# Patient Record
Sex: MALE | Employment: UNEMPLOYED | URBAN - METROPOLITAN AREA
[De-identification: names, ages, dates, MRNs, and addresses within clinical notes are randomized per-mention and may not be internally consistent; named-entity substitution may affect disease eponyms.]

---

## 2017-02-24 ENCOUNTER — HOSPITAL ENCOUNTER (EMERGENCY)
Age: 22
Discharge: HOME | End: 2017-02-24
Attending: EMERGENCY MEDICINE
Payer: SELF-PAY

## 2017-02-24 VITALS
TEMPERATURE: 97.7 F | HEIGHT: 69 IN | SYSTOLIC BLOOD PRESSURE: 117 MMHG | WEIGHT: 155 LBS | OXYGEN SATURATION: 100 % | BODY MASS INDEX: 22.96 KG/M2 | DIASTOLIC BLOOD PRESSURE: 75 MMHG | HEART RATE: 60 BPM | RESPIRATION RATE: 20 BRPM

## 2017-02-24 DIAGNOSIS — R55 SYNCOPE AND COLLAPSE: Primary | ICD-10-CM

## 2017-02-24 LAB
ANION GAP BLD CALC-SCNC: 6 MMOL/L (ref 3–18)
ATRIAL RATE: 60 BPM
BASOPHILS # BLD AUTO: 0 K/UL (ref 0–0.06)
BASOPHILS # BLD: 0 % (ref 0–2)
BUN SERPL-MCNC: 15 MG/DL (ref 7–18)
BUN/CREAT SERPL: 15 (ref 12–20)
CALCIUM SERPL-MCNC: 9.2 MG/DL (ref 8.5–10.1)
CALCULATED P AXIS, ECG09: 61 DEGREES
CALCULATED R AXIS, ECG10: 83 DEGREES
CALCULATED T AXIS, ECG11: 64 DEGREES
CHLORIDE SERPL-SCNC: 105 MMOL/L (ref 100–108)
CO2 SERPL-SCNC: 29 MMOL/L (ref 21–32)
CREAT SERPL-MCNC: 1.03 MG/DL (ref 0.6–1.3)
DIAGNOSIS, 93000: NORMAL
DIFFERENTIAL METHOD BLD: ABNORMAL
EOSINOPHIL # BLD: 0 K/UL (ref 0–0.4)
EOSINOPHIL NFR BLD: 0 % (ref 0–5)
ERYTHROCYTE [DISTWIDTH] IN BLOOD BY AUTOMATED COUNT: 12.5 % (ref 11.6–14.5)
GLUCOSE BLD STRIP.AUTO-MCNC: 91 MG/DL (ref 70–110)
GLUCOSE SERPL-MCNC: 105 MG/DL (ref 74–99)
HCT VFR BLD AUTO: 46.9 % (ref 36–48)
HGB BLD-MCNC: 16.1 G/DL (ref 13–16)
LYMPHOCYTES # BLD AUTO: 34 % (ref 21–52)
LYMPHOCYTES # BLD: 2.4 K/UL (ref 0.9–3.6)
MCH RBC QN AUTO: 30 PG (ref 24–34)
MCHC RBC AUTO-ENTMCNC: 34.3 G/DL (ref 31–37)
MCV RBC AUTO: 87.3 FL (ref 74–97)
MONOCYTES # BLD: 0.6 K/UL (ref 0.05–1.2)
MONOCYTES NFR BLD AUTO: 9 % (ref 3–10)
NEUTS SEG # BLD: 4 K/UL (ref 1.8–8)
NEUTS SEG NFR BLD AUTO: 57 % (ref 40–73)
P-R INTERVAL, ECG05: 120 MS
PLATELET # BLD AUTO: 212 K/UL (ref 135–420)
PMV BLD AUTO: 10.2 FL (ref 9.2–11.8)
POTASSIUM SERPL-SCNC: 3.9 MMOL/L (ref 3.5–5.5)
Q-T INTERVAL, ECG07: 416 MS
QRS DURATION, ECG06: 94 MS
QTC CALCULATION (BEZET), ECG08: 416 MS
RBC # BLD AUTO: 5.37 M/UL (ref 4.7–5.5)
SODIUM SERPL-SCNC: 140 MMOL/L (ref 136–145)
VENTRICULAR RATE, ECG03: 60 BPM
WBC # BLD AUTO: 7 K/UL (ref 4.6–13.2)

## 2017-02-24 PROCEDURE — 85025 COMPLETE CBC W/AUTO DIFF WBC: CPT | Performed by: PHYSICIAN ASSISTANT

## 2017-02-24 PROCEDURE — 93005 ELECTROCARDIOGRAM TRACING: CPT

## 2017-02-24 PROCEDURE — 80048 BASIC METABOLIC PNL TOTAL CA: CPT | Performed by: PHYSICIAN ASSISTANT

## 2017-02-24 PROCEDURE — 82962 GLUCOSE BLOOD TEST: CPT

## 2017-02-24 PROCEDURE — 99283 EMERGENCY DEPT VISIT LOW MDM: CPT
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
## 2017-02-25 NOTE — DISCHARGE INSTRUCTIONS
Return to ED if you develop any new or worsening symptoms such as severe or worsening headaches; somnolence or confusion; restlessness, unsteadiness, or seizures; difficulties with vision; vomiting, fever, or stiff neck; urinary or bowel incontinence; weakness or numbness involving any part of the body. Fainting: Care Instructions  Your Care Instructions    When you faint, or pass out, you lose consciousness for a short time. A brief drop in blood flow to the brain often causes it. When you fall or lie down, more blood flows to your brain and you regain consciousness. Emotional stress, pain, or overheating--especially if you have been standing--can make you faint. In these cases, fainting is usually not serious. But fainting can be a sign of a more serious problem. Your doctor may want you to have more tests to rule out other causes. The treatment you need depends on the reason why you fainted. The doctor has checked you carefully, but problems can develop later. If you notice any problems or new symptoms, get medical treatment right away. Follow-up care is a key part of your treatment and safety. Be sure to make and go to all appointments, and call your doctor if you are having problems. It's also a good idea to know your test results and keep a list of the medicines you take. How can you care for yourself at home? · Drink plenty of fluids to prevent dehydration. If you have kidney, heart, or liver disease and have to limit fluids, talk with your doctor before you increase your fluid intake. When should you call for help? Call 911 anytime you think you may need emergency care. For example, call if:  · You have symptoms of a heart problem. These may include:  ¨ Chest pain or pressure. ¨ Severe trouble breathing. ¨ A fast or irregular heartbeat. ¨ Lightheadedness or sudden weakness. ¨ Coughing up pink, foamy mucus. ¨ Passing out.   After you call 911, the  may tell you to chew 1 adult-strength or 2 to 4 low-dose aspirin. Wait for an ambulance. Do not try to drive yourself. · You have symptoms of a stroke. These may include:  ¨ Sudden numbness, tingling, weakness, or loss of movement in your face, arm, or leg, especially on only one side of your body. ¨ Sudden vision changes. ¨ Sudden trouble speaking. ¨ Sudden confusion or trouble understanding simple statements. ¨ Sudden problems with walking or balance. ¨ A sudden, severe headache that is different from past headaches. · You passed out (lost consciousness) again. Watch closely for changes in your health, and be sure to contact your doctor if:  · You do not get better as expected. Where can you learn more? Go to http://ajit-felton.info/. Enter C893 in the search box to learn more about \"Fainting: Care Instructions. \"  Current as of: May 27, 2016  Content Version: 11.1  © 7778-9454 [employer]. Care instructions adapted under license by [employer] (which disclaims liability or warranty for this information). If you have questions about a medical condition or this instruction, always ask your healthcare professional. [place] [address] any warranty or liability for your use of this information.
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
## 2017-02-25 NOTE — ED NOTES
patient requesting to go home. Patient reports that he \"feels better\". Physician informed. Will continue to monitor.

## 2019-01-10 ENCOUNTER — APPOINTMENT (OUTPATIENT)
Dept: GENERAL RADIOLOGY | Age: 24
End: 2019-01-10
Attending: EMERGENCY MEDICINE
Payer: COMMERCIAL

## 2019-01-10 ENCOUNTER — HOSPITAL ENCOUNTER (EMERGENCY)
Age: 24
Discharge: HOME | End: 2019-01-10
Attending: EMERGENCY MEDICINE
Payer: COMMERCIAL

## 2019-01-10 VITALS
RESPIRATION RATE: 14 BRPM | HEART RATE: 67 BPM | WEIGHT: 160 LBS | HEIGHT: 69 IN | BODY MASS INDEX: 23.7 KG/M2 | SYSTOLIC BLOOD PRESSURE: 138 MMHG | DIASTOLIC BLOOD PRESSURE: 56 MMHG | OXYGEN SATURATION: 100 % | TEMPERATURE: 97.8 F

## 2019-01-10 DIAGNOSIS — R07.89 ATYPICAL CHEST PAIN: Primary | ICD-10-CM

## 2019-01-10 DIAGNOSIS — S29.011A PECTORALIS MUSCLE STRAIN, INITIAL ENCOUNTER: ICD-10-CM

## 2019-01-10 LAB
ANION GAP SERPL CALC-SCNC: 10 MMOL/L (ref 3–18)
ATRIAL RATE: 64 BPM
BASOPHILS # BLD: 0 K/UL (ref 0–0.1)
BASOPHILS NFR BLD: 0 % (ref 0–2)
BUN SERPL-MCNC: 17 MG/DL (ref 7–18)
BUN/CREAT SERPL: 17 (ref 12–20)
CALCIUM SERPL-MCNC: 9.2 MG/DL (ref 8.5–10.1)
CALCULATED P AXIS, ECG09: 54 DEGREES
CALCULATED R AXIS, ECG10: 84 DEGREES
CALCULATED T AXIS, ECG11: 63 DEGREES
CHLORIDE SERPL-SCNC: 105 MMOL/L (ref 100–108)
CK MB CFR SERPL CALC: 0.9 % (ref 0–4)
CK MB SERPL-MCNC: 1.8 NG/ML (ref 5–25)
CK SERPL-CCNC: 192 U/L (ref 39–308)
CO2 SERPL-SCNC: 26 MMOL/L (ref 21–32)
CREAT SERPL-MCNC: 1.01 MG/DL (ref 0.6–1.3)
D DIMER PPP FEU-MCNC: <0.27 UG/ML(FEU)
DIAGNOSIS, 93000: NORMAL
DIFFERENTIAL METHOD BLD: ABNORMAL
EOSINOPHIL # BLD: 0 K/UL (ref 0–0.4)
EOSINOPHIL NFR BLD: 0 % (ref 0–5)
ERYTHROCYTE [DISTWIDTH] IN BLOOD BY AUTOMATED COUNT: 12.6 % (ref 11.6–14.5)
GLUCOSE SERPL-MCNC: 85 MG/DL (ref 74–99)
HCT VFR BLD AUTO: 45.1 % (ref 36–48)
HGB BLD-MCNC: 15.3 G/DL (ref 13–16)
LYMPHOCYTES # BLD: 2.4 K/UL (ref 0.9–3.6)
LYMPHOCYTES NFR BLD: 27 % (ref 21–52)
MAGNESIUM SERPL-MCNC: 1.9 MG/DL (ref 1.6–2.6)
MCH RBC QN AUTO: 30.3 PG (ref 24–34)
MCHC RBC AUTO-ENTMCNC: 33.9 G/DL (ref 31–37)
MCV RBC AUTO: 89.3 FL (ref 74–97)
MONOCYTES # BLD: 0.9 K/UL (ref 0.05–1.2)
MONOCYTES NFR BLD: 11 % (ref 3–10)
NEUTS SEG # BLD: 5.4 K/UL (ref 1.8–8)
NEUTS SEG NFR BLD: 62 % (ref 40–73)
P-R INTERVAL, ECG05: 142 MS
PLATELET # BLD AUTO: 218 K/UL (ref 135–420)
PMV BLD AUTO: 10.3 FL (ref 9.2–11.8)
POTASSIUM SERPL-SCNC: 3.6 MMOL/L (ref 3.5–5.5)
Q-T INTERVAL, ECG07: 388 MS
QRS DURATION, ECG06: 86 MS
QTC CALCULATION (BEZET), ECG08: 400 MS
RBC # BLD AUTO: 5.05 M/UL (ref 4.7–5.5)
SODIUM SERPL-SCNC: 141 MMOL/L (ref 136–145)
TROPONIN I SERPL-MCNC: <0.02 NG/ML (ref 0–0.06)
VENTRICULAR RATE, ECG03: 64 BPM
WBC # BLD AUTO: 8.8 K/UL (ref 4.6–13.2)

## 2019-01-10 PROCEDURE — 71046 X-RAY EXAM CHEST 2 VIEWS: CPT

## 2019-01-10 PROCEDURE — 74011250636 HC RX REV CODE- 250/636: Performed by: EMERGENCY MEDICINE

## 2019-01-10 PROCEDURE — 93005 ELECTROCARDIOGRAM TRACING: CPT

## 2019-01-10 PROCEDURE — 85379 FIBRIN DEGRADATION QUANT: CPT

## 2019-01-10 PROCEDURE — 96374 THER/PROPH/DIAG INJ IV PUSH: CPT

## 2019-01-10 PROCEDURE — 85025 COMPLETE CBC W/AUTO DIFF WBC: CPT

## 2019-01-10 PROCEDURE — 82550 ASSAY OF CK (CPK): CPT

## 2019-01-10 PROCEDURE — 96361 HYDRATE IV INFUSION ADD-ON: CPT

## 2019-01-10 PROCEDURE — 80048 BASIC METABOLIC PNL TOTAL CA: CPT

## 2019-01-10 PROCEDURE — 99285 EMERGENCY DEPT VISIT HI MDM: CPT

## 2019-01-10 PROCEDURE — 83735 ASSAY OF MAGNESIUM: CPT

## 2019-01-10 RX ORDER — KETOROLAC TROMETHAMINE 30 MG/ML
30 INJECTION, SOLUTION INTRAMUSCULAR; INTRAVENOUS
Status: COMPLETED | OUTPATIENT
Start: 2019-01-10 | End: 2019-01-10

## 2019-01-10 RX ORDER — IBUPROFEN 600 MG/1
600 TABLET ORAL
Qty: 20 TAB | Refills: 0 | Status: SHIPPED | OUTPATIENT
Start: 2019-01-10

## 2019-01-10 RX ORDER — CYCLOBENZAPRINE HCL 10 MG
10 TABLET ORAL
Qty: 6 TAB | Refills: 0 | Status: SHIPPED | OUTPATIENT
Start: 2019-01-10

## 2019-01-10 RX ADMIN — SODIUM CHLORIDE 1000 ML: 900 INJECTION, SOLUTION INTRAVENOUS at 15:56

## 2019-01-10 RX ADMIN — KETOROLAC TROMETHAMINE 30 MG: 30 INJECTION, SOLUTION INTRAMUSCULAR at 15:57

## 2019-01-10 NOTE — ED TRIAGE NOTES
Patient developed sharp stabbing pains to left side of chest after lunch today with SOB. He states pain still present but milder now.

## 2019-01-10 NOTE — LETTER
[address] [place] [address] [facility] 
[address] [facility] [address] [phone] Work/School Note Date: 1/10/2019 To Whom It May concern: 
 
Katiana Quevedo was seen and treated today in the emergency room by the following provider(s): 
Attending Provider: Freddie Esparza MD.   
 
Katiana Quevedo may return to work as tolerated on ., 1/11/19. Sincerely, Freddie Esparza MD

## 2019-01-10 NOTE — ED NOTES
Patient armband removed and shreddedI have reviewed discharge instructions with the patient and parent. The patient and parent verbalized understanding.  rx x 2 and work note given;

## 2019-01-10 NOTE — ED PROVIDER NOTES
EMERGENCY DEPARTMENT HISTORY AND PHYSICAL EXAM 
 
3:05 PM 
 
 
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 male with PMHx of diabetes, vertigo, concussion who presents with an acute onset of sharp moderate left sided chest pain that started \"this afternoon\". Pt states the cp started while he was at work moving boards. He then reports that his \"legs buckled\" however, denies having a syncopal episode. Pt admits to some recreational drug use and ETOH use. \"smoked pot\" x 1 day ago. Denies any further complaints or symptoms at the moment. PCP: Ethan Van MD 
 
 
 
Past History Past Medical History: 
Past Medical History:  
Diagnosis Date  Concussion  Diabetes ([address] borderline  Respiratory distress syndrome in   Vertigo Past Surgical History: 
History reviewed. No pertinent surgical history. Family History: 
Family History Problem Relation Age of Onset  Diabetes Maternal Grandmother Social History: 
Social History Tobacco Use  Smoking status: Passive Smoke Exposure - Never Smoker Substance Use Topics  Alcohol use: No  
 Drug use: Yes Types: Marijuana Comment: last smoked 19 Allergies: 
No Known Allergies Review of Systems Review of Systems Constitutional: Negative for activity change, fatigue and fever. HENT: Negative for congestion and rhinorrhea. Eyes: Negative for visual disturbance. Respiratory: Negative for shortness of breath. Cardiovascular: Positive for chest pain. Negative for palpitations. Gastrointestinal: Negative for abdominal pain, diarrhea, nausea and vomiting. Genitourinary: Negative for dysuria and hematuria. Musculoskeletal: Negative for back pain. Skin: Negative for rash. Neurological: Negative for dizziness, weakness and light-headedness. All other systems reviewed and are negative. Physical Exam  
 
Visit Vitals /68 Pulse 67 Temp 97.8 °F (36.6 °C) Resp 16 Ht 5' 9\" (1.753 m) Wt 72.6 kg (160 lb) SpO2 98% BMI 23.63 kg/m² Physical Exam  
Constitutional: He is oriented to person, place, and time. He appears well-developed and well-nourished. No distress. HENT:  
Head: Normocephalic and atraumatic. Right Ear: External ear normal.  
Left Ear: External ear normal.  
Nose: Nose normal.  
Mouth/Throat: Oropharynx is clear and moist.  
Eyes: Conjunctivae and EOM are normal. Pupils are equal, round, and reactive to light. No scleral icterus. Neck: Normal range of motion. Neck supple. No JVD present. No tracheal deviation present. No thyromegaly present. Cardiovascular: Normal rate, regular rhythm, normal heart sounds and intact distal pulses. Exam reveals no gallop and no friction rub. No murmur heard. Pulmonary/Chest: Effort normal and breath sounds normal. He exhibits tenderness. Pain on palpation of the L breast, no mass noted Abdominal: Soft. Bowel sounds are normal. He exhibits no distension. There is no tenderness. There is no rebound and no guarding. Musculoskeletal: Normal range of motion. He exhibits no edema or tenderness. Lymphadenopathy:  
  He has no cervical adenopathy. Neurological: He is alert and oriented to person, place, and time. No cranial nerve deficit. Coordination normal.  
No sensory loss, Gait normal, Motor 5/5 Skin: Skin is warm and dry. Psychiatric: He has a normal mood and affect.  His behavior is normal. Judgment and thought content normal.  
Nursing note and vitals reviewed. Diagnostic Study Results Labs - Recent Results (from the past 12 hour(s)) EKG, 12 LEAD, INITIAL Collection Time: 01/10/19  2:18 PM  
Result Value Ref Range Ventricular Rate 64 BPM  
 Atrial Rate 64 BPM  
 P-R Interval 142 ms QRS Duration 86 ms  
 Q-T Interval 388 ms QTC Calculation (Bezet) 400 ms Calculated P Axis 54 degrees Calculated R Axis 84 degrees Calculated T Axis 63 degrees Diagnosis Sinus rhythm with marked sinus arrhythmia Otherwise normal ECG When compared with ECG of 24-FEB-2017 18:08, No significant change was found Confirmed by Kya Ayala MD, ----- ([phone]) on 1/10/2019 4:04:11 PM 
  
CBC WITH AUTOMATED DIFF Collection Time: 01/10/19  2:35 PM  
Result Value Ref Range WBC 8.8 4.6 - 13.2 K/uL  
 RBC 5.05 4.70 - 5.50 M/uL  
 HGB 15.3 13.0 - 16.0 g/dL HCT 45.1 36.0 - 48.0 % MCV 89.3 74.0 - 97.0 FL  
 MCH 30.3 24.0 - 34.0 PG  
 MCHC 33.9 31.0 - 37.0 g/dL  
 RDW 12.6 11.6 - 14.5 % PLATELET 658 748 - 764 K/uL MPV 10.3 9.2 - 11.8 FL  
 NEUTROPHILS 62 40 - 73 % LYMPHOCYTES 27 21 - 52 % MONOCYTES 11 (H) 3 - 10 % EOSINOPHILS 0 0 - 5 % BASOPHILS 0 0 - 2 %  
 ABS. NEUTROPHILS 5.4 1.8 - 8.0 K/UL  
 ABS. LYMPHOCYTES 2.4 0.9 - 3.6 K/UL  
 ABS. MONOCYTES 0.9 0.05 - 1.2 K/UL  
 ABS. EOSINOPHILS 0.0 0.0 - 0.4 K/UL  
 ABS. BASOPHILS 0.0 0.0 - 0.1 K/UL  
 DF AUTOMATED CARDIAC PANEL,(CK, CKMB & TROPONIN) Collection Time: 01/10/19  2:35 PM  
Result Value Ref Range  39 - 308 U/L  
 CK - MB 1.8 <3.6 ng/ml CK-MB Index 0.9 0.0 - 4.0 % Troponin-I, QT <0.02 0.00 - 7.14 NG/ML  
METABOLIC PANEL, BASIC Collection Time: 01/10/19  2:35 PM  
Result Value Ref Range Sodium 141 136 - 145 mmol/L Potassium 3.6 3.5 - 5.5 mmol/L Chloride 105 100 - 108 mmol/L  
 CO2 26 21 - 32 mmol/L Anion gap 10 3.0 - 18 mmol/L Glucose 85 74 - 99 mg/dL BUN 17 7.0 - 18 MG/DL Creatinine 1.01 0.6 - 1.3 MG/DL  
 BUN/Creatinine ratio 17 12 - 20 GFR est AA >60 >60 ml/min/1.73m2 GFR est non-AA >60 >60 ml/min/1.73m2 Calcium 9.2 8.5 - 10.1 MG/DL MAGNESIUM Collection Time: 01/10/19  2:35 PM  
Result Value Ref Range Magnesium 1.9 1.6 - 2.6 mg/dL D DIMER Collection Time: 01/10/19  2:35 PM  
Result Value Ref Range D DIMER <0.27 <0.46 ug/ml(FEU) Radiologic Studies -  
XR CHEST PA LAT Final Result IMPRESSION:  
  
No acute findings. Medical Decision Making I am the first provider for this patient. I reviewed the vital signs, available nursing notes, past medical history, past surgical history, family history and social history. Vital Signs-Reviewed the patient's vital signs. Pulse Oximetry Analysis -  100 % on RA, normal  
 
EKG: Interpreted by the EP. Time Interpreted: 14:18 Rate: 64 bpm  
 Rhythm: Sinus Rhythm Interpretation: no STEMI Records Reviewed: Nursing Notes and Old Medical Records (Time of Review: 3:05 PM) ED Course: Progress Notes, Reevaluation, and Consults: 
 
Provider Notes (Medical Decision Making): MDM Number of Diagnoses or Management Options Diagnosis management comments: Pt is a 19yo male without significant medical hx presents with complaint of L breast pain that began at work while he was pulling nails out of wood. Pt had 2 episodes of sharp pain that buckled his legs. In the ED his ekg, CXR, cardiac markers are all reassuring. Do not suspect PE. Will control his pain, hydrated, and plan for close outpatient care. Nas Reynoso DO 3:40 PM 
 
 
 
Diagnosis Clinical Impression: 1. Atypical chest pain 2. Pectoralis muscle strain, initial encounter Disposition: discharged. Follow-up Information Follow up With Specialties Details Why Contact Tin Yanez MD Family Practice Schedule an appointment as soon as possible for a visit For [address] [facility] [address] 
[phone] [address] [facility] EMERGENCY DEPT Emergency Medicine Go to As needed, If symptoms worsen [place]i [place] [address] [phone] Medication List  
  
START taking these medications   
cyclobenzaprine 10 mg tablet Commonly known as:  FLEXERIL Take 1 Tab by mouth three (3) times daily as needed for Muscle Spasm(s). ibuprofen 600 mg tablet Commonly known as:  MOTRIN Take 1 Tab by mouth every six (6) hours as needed for Pain. Where to Get Your Medications Information about where to get these medications is not yet available Ask your nurse or doctor about these medications · cyclobenzaprine 10 mg tablet · ibuprofen 600 mg tablet 
  
 
_______________________________ Scribe Attestation Robbie Ochoa (Aj) acting as a scribe for and in the presence of Renea Bennett MD     
January 10, 2019 at 3:48 PM 
    
Provider Attestation:     
I personally performed the services described in the documentation, reviewed the documentation, as recorded by the scribe in my presence, and it accurately and completely records my words and actions. January 10, 2019 at 3:48 PM - Renea Bennett MD   
_______________________________

## 2019-01-10 NOTE — DISCHARGE INSTRUCTIONS

## 2023-05-31 ENCOUNTER — HOSPITAL ENCOUNTER (EMERGENCY)
Facility: HOSPITAL | Age: 28
Discharge: HOME | End: 2023-05-31
Attending: EMERGENCY MEDICINE

## 2023-05-31 VITALS
DIASTOLIC BLOOD PRESSURE: 79 MMHG | WEIGHT: 185 LBS | RESPIRATION RATE: 18 BRPM | HEIGHT: 68 IN | OXYGEN SATURATION: 100 % | SYSTOLIC BLOOD PRESSURE: 133 MMHG | HEART RATE: 74 BPM | TEMPERATURE: 98.1 F | BODY MASS INDEX: 28.04 KG/M2

## 2023-05-31 DIAGNOSIS — T14.8XXA OPEN WOUND: Primary | ICD-10-CM

## 2023-05-31 PROCEDURE — 99283 EMERGENCY DEPT VISIT LOW MDM: CPT

## 2023-05-31 RX ORDER — CEPHALEXIN 500 MG/1
500 CAPSULE ORAL 4 TIMES DAILY
Qty: 28 CAPSULE | Refills: 0 | Status: SHIPPED | OUTPATIENT
Start: 2023-05-31 | End: 2023-06-07

## 2023-05-31 ASSESSMENT — PAIN SCALES - GENERAL: PAINLEVEL_OUTOF10: 2

## 2023-05-31 ASSESSMENT — ENCOUNTER SYMPTOMS
NAUSEA: 0
SHORTNESS OF BREATH: 0
EYE DISCHARGE: 0
DIARRHEA: 0
ABDOMINAL PAIN: 0
COUGH: 0
SORE THROAT: 0

## 2023-05-31 ASSESSMENT — PAIN DESCRIPTION - DESCRIPTORS: DESCRIPTORS: ACHING

## 2023-05-31 ASSESSMENT — LIFESTYLE VARIABLES
HOW OFTEN DO YOU HAVE A DRINK CONTAINING ALCOHOL: NEVER
HOW MANY STANDARD DRINKS CONTAINING ALCOHOL DO YOU HAVE ON A TYPICAL DAY: PATIENT DOES NOT DRINK

## 2023-05-31 ASSESSMENT — PAIN - FUNCTIONAL ASSESSMENT: PAIN_FUNCTIONAL_ASSESSMENT: 0-10

## 2023-05-31 ASSESSMENT — PAIN DESCRIPTION - PAIN TYPE: TYPE: ACUTE PAIN

## 2023-05-31 NOTE — ED PROVIDER NOTES
Temp: 98.1 °F (36.7 °C)   TempSrc: Oral   SpO2: 100%   Weight: 185 lb (83.9 kg)   Height: 5' 8\" (1.727 m)     Physical Exam  Vitals and nursing note reviewed. Constitutional:       General: He is not in acute distress. Appearance: He is not toxic-appearing. HENT:      Head: Atraumatic. Nose: Nose normal.      Mouth/Throat:      Mouth: Mucous membranes are moist.   Eyes:      Extraocular Movements: Extraocular movements intact. Pupils: Pupils are equal, round, and reactive to light. Cardiovascular:      Rate and Rhythm: Normal rate. Pulmonary:      Effort: Pulmonary effort is normal.      Breath sounds: Normal breath sounds. Abdominal:      General: There is no distension. Tenderness: There is no abdominal tenderness. Musculoskeletal:      Cervical back: Normal range of motion. Right lower leg: No edema. Left lower leg: No edema. Skin:     General: Skin is warm and dry. Findings: Abrasion present. Comments: Large upper left leg abrasion noted with healthy granulation tissue. No drainage induration warmth or fluctuance   Neurological:      General: No focal deficit present. Mental Status: He is alert and oriented to person, place, and time. Mental status is at baseline. Psychiatric:         Mood and Affect: Mood normal.         Diagnostic Study Results     Labs -   No results found for this or any previous visit (from the past 12 hour(s)). Radiologic Studies -   No orders to display           Medical Decision Making   I am the first provider for this patient. I reviewed the vital signs, available nursing notes, past medical history, past surgical history, family history and social history. Vital Signs-Reviewed the patient's vital signs.       Records Reviewed: Nursing Notes and Old Medical Records     Procedures: None   Procedures    Provider Notes (Medical Decision Making):     Differential: Laceration, abrasion, contusion, cellulitis,
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
## 2024-03-08 ENCOUNTER — HOSPITAL ENCOUNTER (EMERGENCY)
Facility: HOSPITAL | Age: 29
Discharge: HOME | End: 2024-03-08
Attending: EMERGENCY MEDICINE
Payer: COMMERCIAL

## 2024-03-08 VITALS
RESPIRATION RATE: 16 BRPM | HEART RATE: 84 BPM | SYSTOLIC BLOOD PRESSURE: 130 MMHG | DIASTOLIC BLOOD PRESSURE: 78 MMHG | WEIGHT: 190 LBS | TEMPERATURE: 97.8 F | BODY MASS INDEX: 28.79 KG/M2 | HEIGHT: 68 IN | OXYGEN SATURATION: 100 %

## 2024-03-08 DIAGNOSIS — T14.8XXA BITE BY ANIMAL: Primary | ICD-10-CM

## 2024-03-08 PROCEDURE — 99283 EMERGENCY DEPT VISIT LOW MDM: CPT

## 2024-03-08 RX ORDER — NAPROXEN 500 MG/1
500 TABLET ORAL 2 TIMES DAILY
Qty: 60 TABLET | Refills: 0 | Status: SHIPPED | OUTPATIENT
Start: 2024-03-08

## 2024-03-08 ASSESSMENT — PAIN SCALES - GENERAL: PAINLEVEL_OUTOF10: 6

## 2024-03-08 ASSESSMENT — PAIN - FUNCTIONAL ASSESSMENT: PAIN_FUNCTIONAL_ASSESSMENT: 0-10

## 2024-03-08 NOTE — ED PROVIDER NOTES
[facility] EMERGENCY DEPT  EMERGENCY DEPARTMENT HISTORY AND PHYSICAL EXAM      Date: 3/8/2024  Patient Name: Kevyn Leblanc  MRN: [id number]  Birthdate 1995  Date of evaluation: 3/8/2024  Provider: Sidney Flores MD   Note Started: 10:14 AM EST 3/8/24    HISTORY OF PRESENT ILLNESS     Chief Complaint   Patient presents with    Animal Bite       History Provided By: Patient    HPI: Kevyn Leblanc is a 28 y.o. male who was bit 3 days ago by unknown cat.  Patient states that he was given a shot and started antibiotics yesterday and took his third dose this morning.  Says that his redness in his hand has improved yet.  He denies any fever, chills, numbness, tingling, loss of function.  He says he got his tetanus shot yesterday also.  It was his own cat and the cat is up-to-date on chart    PAST MEDICAL HISTORY   Past Medical History:  No past medical history on file.    Past Surgical History:  No past surgical history on file.    Family History:  No family history on file.    Social History:       Allergies:  No Known Allergies    PCP: Dwight Duke DO    Current Meds:   No current facility-administered medications for this encounter.     Current Outpatient Medications   Medication Sig Dispense Refill    naproxen (NAPROSYN) 500 MG tablet Take 1 tablet by mouth 2 times daily 60 tablet 0       Social Determinants of Health:   Social Determinants of Health     Tobacco Use: Not on file   Alcohol Use: Not At Risk (5/31/2023)    AUDIT-C     Frequency of Alcohol Consumption: Never     Average Number of Drinks: Patient does not drink     Frequency of Binge Drinking: Never   Financial Resource Strain: Not on file   Food Insecurity: Not on file   Transportation Needs: Not on file   Physical Activity: Not on file   Stress: Not on file   Social Connections: Not on file   Intimate Partner Violence: Not on file   Depression: Not on file   Housing Stability: Not on file   Interpersonal Safety: Not on file   Utilities: Not on file 
 4-calculated by average/Not able to assess (calculate score using [facility] averaging method)

## 2024-03-08 NOTE — ED TRIAGE NOTES
Patient arrived to ER due to increased swelling to his right hand, cat bite 2 days ago. Patient went to urgent care and received tetanus shot and antibiotic injection as well as PO amoxicillin.